# Patient Record
Sex: FEMALE
[De-identification: names, ages, dates, MRNs, and addresses within clinical notes are randomized per-mention and may not be internally consistent; named-entity substitution may affect disease eponyms.]

---

## 2020-05-24 ENCOUNTER — NURSE TRIAGE (OUTPATIENT)
Dept: OTHER | Facility: CLINIC | Age: 60
End: 2020-05-24

## 2020-05-24 NOTE — TELEPHONE ENCOUNTER
Call on MMO line:    Patient experiencing diarrhea and the toilet water is red in color. Diarrhea started Saturday morning. Each time she has had a BM the toilet water has been red. No blood clots passed. Patient reports high stress time due to loss of her sister with COVID complications. No abdominal pain, no urgency to go to the bathroom. Protocol recommendation shared to be evaluated in the ED and care advice shared.      Reason for Disposition   [1] MODERATE rectal bleeding (small blood clots, passing blood without stool, or toilet water turns red) AND [2] more than once a day    Protocols used: RECTAL BLEEDING-ADULT-